# Patient Record
Sex: MALE | Race: WHITE | Employment: OTHER | URBAN - METROPOLITAN AREA
[De-identification: names, ages, dates, MRNs, and addresses within clinical notes are randomized per-mention and may not be internally consistent; named-entity substitution may affect disease eponyms.]

---

## 2019-01-10 ENCOUNTER — APPOINTMENT (OUTPATIENT)
Dept: GENERAL RADIOLOGY | Age: 69
End: 2019-01-10
Attending: EMERGENCY MEDICINE
Payer: MEDICARE

## 2019-01-10 ENCOUNTER — APPOINTMENT (OUTPATIENT)
Dept: CT IMAGING | Age: 69
End: 2019-01-10
Attending: EMERGENCY MEDICINE
Payer: MEDICARE

## 2019-01-10 ENCOUNTER — HOSPITAL ENCOUNTER (EMERGENCY)
Age: 69
Discharge: PSYCHIATRIC OR SUBSTANCE USE FACILITY | End: 2019-01-10
Attending: EMERGENCY MEDICINE
Payer: MEDICARE

## 2019-01-10 VITALS
HEART RATE: 93 BPM | OXYGEN SATURATION: 99 % | BODY MASS INDEX: 21.77 KG/M2 | RESPIRATION RATE: 19 BRPM | HEIGHT: 69 IN | DIASTOLIC BLOOD PRESSURE: 89 MMHG | TEMPERATURE: 98.3 F | SYSTOLIC BLOOD PRESSURE: 160 MMHG | WEIGHT: 147 LBS

## 2019-01-10 DIAGNOSIS — S00.432A HEMATOMA OF LEFT EAR, INITIAL ENCOUNTER: Primary | ICD-10-CM

## 2019-01-10 DIAGNOSIS — D69.6 THROMBOCYTOPENIA (HCC): ICD-10-CM

## 2019-01-10 DIAGNOSIS — H93.8X2 MASS OF LEFT EAR: ICD-10-CM

## 2019-01-10 DIAGNOSIS — R74.8 ABNORMAL LIVER ENZYMES: ICD-10-CM

## 2019-01-10 LAB
ALBUMIN SERPL-MCNC: 4.3 G/DL (ref 3.4–5)
ALBUMIN/GLOB SERPL: 1.3 {RATIO} (ref 0.8–1.7)
ALP SERPL-CCNC: 141 U/L (ref 45–117)
ALT SERPL-CCNC: 164 U/L (ref 16–61)
ANION GAP SERPL CALC-SCNC: 8 MMOL/L (ref 3–18)
AST SERPL-CCNC: 262 U/L (ref 15–37)
BASOPHILS # BLD: 0 K/UL (ref 0–0.1)
BASOPHILS NFR BLD: 0 % (ref 0–2)
BILIRUB SERPL-MCNC: 2.2 MG/DL (ref 0.2–1)
BUN SERPL-MCNC: 8 MG/DL (ref 7–18)
BUN/CREAT SERPL: 12 (ref 12–20)
CALCIUM SERPL-MCNC: 10 MG/DL (ref 8.5–10.1)
CHLORIDE SERPL-SCNC: 100 MMOL/L (ref 100–108)
CO2 SERPL-SCNC: 28 MMOL/L (ref 21–32)
CREAT SERPL-MCNC: 0.67 MG/DL (ref 0.6–1.3)
DIFFERENTIAL METHOD BLD: ABNORMAL
EOSINOPHIL # BLD: 0.1 K/UL (ref 0–0.4)
EOSINOPHIL NFR BLD: 1 % (ref 0–5)
ERYTHROCYTE [DISTWIDTH] IN BLOOD BY AUTOMATED COUNT: 13.4 % (ref 11.6–14.5)
ETHANOL SERPL-MCNC: <3 MG/DL (ref 0–3)
GLOBULIN SER CALC-MCNC: 3.3 G/DL (ref 2–4)
GLUCOSE SERPL-MCNC: 148 MG/DL (ref 74–99)
HCT VFR BLD AUTO: 38.8 % (ref 36–48)
HGB BLD-MCNC: 13.3 G/DL (ref 13–16)
LYMPHOCYTES # BLD: 1.2 K/UL (ref 0.9–3.6)
LYMPHOCYTES NFR BLD: 28 % (ref 21–52)
MCH RBC QN AUTO: 33.8 PG (ref 24–34)
MCHC RBC AUTO-ENTMCNC: 34.3 G/DL (ref 31–37)
MCV RBC AUTO: 98.7 FL (ref 74–97)
MONOCYTES # BLD: 0.5 K/UL (ref 0.05–1.2)
MONOCYTES NFR BLD: 13 % (ref 3–10)
NEUTS SEG # BLD: 2.3 K/UL (ref 1.8–8)
NEUTS SEG NFR BLD: 58 % (ref 40–73)
PLATELET # BLD AUTO: 71 K/UL (ref 135–420)
PMV BLD AUTO: 10.3 FL (ref 9.2–11.8)
POTASSIUM SERPL-SCNC: 3.4 MMOL/L (ref 3.5–5.5)
PROT SERPL-MCNC: 7.6 G/DL (ref 6.4–8.2)
RBC # BLD AUTO: 3.93 M/UL (ref 4.7–5.5)
SODIUM SERPL-SCNC: 136 MMOL/L (ref 136–145)
WBC # BLD AUTO: 4.1 K/UL (ref 4.6–13.2)

## 2019-01-10 PROCEDURE — 93005 ELECTROCARDIOGRAM TRACING: CPT

## 2019-01-10 PROCEDURE — 85025 COMPLETE CBC W/AUTO DIFF WBC: CPT

## 2019-01-10 PROCEDURE — 96374 THER/PROPH/DIAG INJ IV PUSH: CPT

## 2019-01-10 PROCEDURE — 74011250636 HC RX REV CODE- 250/636: Performed by: EMERGENCY MEDICINE

## 2019-01-10 PROCEDURE — 70450 CT HEAD/BRAIN W/O DYE: CPT

## 2019-01-10 PROCEDURE — 80307 DRUG TEST PRSMV CHEM ANLYZR: CPT

## 2019-01-10 PROCEDURE — 71045 X-RAY EXAM CHEST 1 VIEW: CPT

## 2019-01-10 PROCEDURE — 96361 HYDRATE IV INFUSION ADD-ON: CPT

## 2019-01-10 PROCEDURE — 80053 COMPREHEN METABOLIC PANEL: CPT

## 2019-01-10 PROCEDURE — 99285 EMERGENCY DEPT VISIT HI MDM: CPT

## 2019-01-10 RX ORDER — FAMOTIDINE 10 MG/ML
20 INJECTION INTRAVENOUS
Status: COMPLETED | OUTPATIENT
Start: 2019-01-10 | End: 2019-01-10

## 2019-01-10 RX ADMIN — FAMOTIDINE 20 MG: 10 INJECTION, SOLUTION INTRAVENOUS at 15:36

## 2019-01-10 RX ADMIN — SODIUM CHLORIDE 1000 ML: 900 INJECTION, SOLUTION INTRAVENOUS at 12:02

## 2019-01-10 NOTE — ED NOTES
Purposeful rounding completed: 
 
Side rails up x 2:  YES Bed in low position and wheels locked: YES Call bell within reach: YES Comfort addressed: YES Toileting needs addressed: YES Plan of care reviewed/updated with patient and or family members: YES 
IV site assessed: YES Pain assessed and addressed: YES, 6 Pt given urinal; informed pt that transportation would be here to pick him up within the hour

## 2019-01-10 NOTE — ED NOTES
Debrided pt ear and scalp with arm soapy water; marble size lump on the ear was the only area that was bleeding; Did not see any lacerations or abrasions.  Pt was very sensitive to us touching the area but was not complaining of pain;

## 2019-01-10 NOTE — ED NOTES
Pt stated that he has elevated blood pressure and elevated heart rate for at least 10 years. Pt also stated that he does not take any medication for his bp or hr. Pt also stated that he has skin cancer on his left ear despite not having a biopsy or being seen for his ear.

## 2019-01-10 NOTE — ED PROVIDER NOTES
EMERGENCY DEPARTMENT HISTORY AND PHYSICAL EXAM 
 
10:51 AM 
 
 
Date: 1/10/2019 Patient Name: En Ramírez History of Presenting Illness Chief Complaint Patient presents with  Fall  
  left ear has laceration and dried blood History Provided By: Patient Chief Complaint: left ear wound Duration:  last night Timing:  Acute Location: left ear Severity: Moderate Modifying Factors: Pt reports that he was watching TV and woke up and noticed that his ear was bleeding. He woke up on the floor. Associated Symptoms: Denies CP, SOB, Fever, NVD, and cough. Additional History (Context): En Ramírez is a 70 y.o. male with back problems-\"broke his back\" who presents with acute moderate left ear wound that occurred last night. Pt reports that he was watching TV and woke up and noticed that his ear was bleeding. He woke up on the floor. Pt uses a cane for ambulation. Denies CP, SOB, Fever, NVD, and cough. Pt is on Asprin. Pt reports long history of falls due to dizziness- has had workups including MRI in past with no etiology found. Also reports mass on ear for 10 years which he has not seen a doctor for. He lives alone. No ETOH in 3 days but reports he is alcoholic. No drug use. He smokes cigars. PCP: UNKNOWN 
 
 
 
Past History Past Medical History: 
Back problems-\"broke his back\" Past Surgical History: No past surgical history on file. Family History: No family history on file. Social History: 
Social History Tobacco Use  Smoking status: Not on file Substance Use Topics  Alcohol use: Not on file  Drug use: Not on file Allergies: 
No Known Allergies Review of Systems Review of Systems Constitutional: Negative for chills. HENT: Negative for congestion and sore throat. Respiratory: Negative for cough and shortness of breath. Cardiovascular: Negative for chest pain. Gastrointestinal: Negative for abdominal pain, diarrhea, nausea and vomiting. Genitourinary: Negative for dysuria. Musculoskeletal: Negative for back pain. Skin: Positive for wound (ear). Negative for rash. Neurological: Negative for dizziness and headaches. All other systems reviewed and are negative. Physical Exam  
 
Visit Vitals /81 Pulse 93 Temp 98.3 °F (36.8 °C) Resp 19 Ht 5' 9\" (1.753 m) Wt 66.7 kg (147 lb) SpO2 100% BMI 21.71 kg/m² Physical Exam 
General Exam: Patient is well developed and well nourished in no distress. Patient does not appear acutely ill or toxic. Eye Exam: Lids and conjunctiva are normal 
ENT Exam: The neck is supple without meningeal signs. No significant adenopathy. L ear: abnormal marble size mass to superior portion of left ear with active bleeding/oozing. Large auricular hematoma. Pulmonary Exam: No respiratory distress. The respiratory rate is normal.  No stridor. The breath sounds are equal bilaterally. There are no wheezes, rales, or rhonchi noted. Cardiac Exam: Tachycardic. Cardiac rhythm ise normal.  No significant murmurs, rubs, or gallops. The peripheral pulses are normal. 
Abdominal Exam: Abdomen is soft and non-distended. No pulsatile masses. There is no local tenderness. There is no rebound or guarding noted. Skin and Soft Tissue: The skin is warm and dry, without significant abnormality. Good color. Musculoskeletal Exam: There is no clubbing or cyanosis. There is no peripheral edema. The musculoskeletal exam of the lower extremities is normal without significant local tenderness or spasm. Neurologic: Pt is alert and appropriate. Normal speech. Normal symmetric muscle strength and tone in all extremities. Normal gait. Psychiatric: Normal adult with appropriate demeanor and interpersonal interaction. Is oriented to person, place, and time. Diagnostic Study Results Labs - 
 Recent Results (from the past 12 hour(s)) METABOLIC PANEL, COMPREHENSIVE Collection Time: 01/10/19 11:35 AM  
Result Value Ref Range Sodium 136 136 - 145 mmol/L Potassium 3.4 (L) 3.5 - 5.5 mmol/L Chloride 100 100 - 108 mmol/L  
 CO2 28 21 - 32 mmol/L Anion gap 8 3.0 - 18 mmol/L Glucose 148 (H) 74 - 99 mg/dL BUN 8 7.0 - 18 MG/DL Creatinine 0.67 0.6 - 1.3 MG/DL  
 BUN/Creatinine ratio 12 12 - 20 GFR est AA >60 >60 ml/min/1.73m2 GFR est non-AA >60 >60 ml/min/1.73m2 Calcium 10.0 8.5 - 10.1 MG/DL Bilirubin, total 2.2 (H) 0.2 - 1.0 MG/DL  
 ALT (SGPT) 164 (H) 16 - 61 U/L  
 AST (SGOT) 262 (H) 15 - 37 U/L Alk. phosphatase 141 (H) 45 - 117 U/L Protein, total 7.6 6.4 - 8.2 g/dL Albumin 4.3 3.4 - 5.0 g/dL Globulin 3.3 2.0 - 4.0 g/dL A-G Ratio 1.3 0.8 - 1.7    
CBC WITH AUTOMATED DIFF Collection Time: 01/10/19 11:35 AM  
Result Value Ref Range WBC 4.1 (L) 4.6 - 13.2 K/uL  
 RBC 3.93 (L) 4.70 - 5.50 M/uL  
 HGB 13.3 13.0 - 16.0 g/dL HCT 38.8 36.0 - 48.0 % MCV 98.7 (H) 74.0 - 97.0 FL  
 MCH 33.8 24.0 - 34.0 PG  
 MCHC 34.3 31.0 - 37.0 g/dL  
 RDW 13.4 11.6 - 14.5 % PLATELET 71 (L) 556 - 420 K/uL MPV 10.3 9.2 - 11.8 FL  
 NEUTROPHILS 58 40 - 73 % LYMPHOCYTES 28 21 - 52 % MONOCYTES 13 (H) 3 - 10 % EOSINOPHILS 1 0 - 5 % BASOPHILS 0 0 - 2 %  
 ABS. NEUTROPHILS 2.3 1.8 - 8.0 K/UL  
 ABS. LYMPHOCYTES 1.2 0.9 - 3.6 K/UL  
 ABS. MONOCYTES 0.5 0.05 - 1.2 K/UL  
 ABS. EOSINOPHILS 0.1 0.0 - 0.4 K/UL  
 ABS. BASOPHILS 0.0 0.0 - 0.1 K/UL  
 DF AUTOMATED    
ETHYL ALCOHOL Collection Time: 01/10/19 11:35 AM  
Result Value Ref Range ALCOHOL(ETHYL),SERUM <3 0 - 3 MG/DL  
EKG, 12 LEAD, INITIAL Collection Time: 01/10/19 11:35 AM  
Result Value Ref Range Ventricular Rate 103 BPM  
 Atrial Rate 103 BPM  
 P-R Interval 142 ms QRS Duration 76 ms  
 Q-T Interval 354 ms QTC Calculation (Bezet) 463 ms Calculated P Axis 34 degrees Calculated R Axis 8 degrees Calculated T Axis 22 degrees Diagnosis Sinus tachycardia Possible Left atrial enlargement Nonspecific ST abnormality Abnormal ECG No previous ECGs available Radiologic Studies -  
CT HEAD WO CONT Final Result IMPRESSION:  
  
  
1. No acute intracranial abnormality. 2. Mild periventricular white matter hypoattenuation; a nonspecific finding,  
likely reflecting chronic ischemic microvascular change. XR CHEST PORT    (Results Pending) Medical Decision Making I am the first provider for this patient. I reviewed the vital signs, available nursing notes, past medical history, past surgical history, family history and social history. Vital Signs-Reviewed the patient's vital signs. Pulse Oximetry Analysis -  100%L of O2 via NC  
 
EKG: Interpreted by the EP. Time Interpreted: 9381 Rate: 103 Rhythm: Sinus Tachycardia Interpretation:No STEMI Records Reviewed: Nursing Notes (Time of Review: 10:51 AM) ED Course: Progress Notes, Reevaluation, and Consults: 
2:06 PM Consult: I discussed care with Dr. Jackelyn myles (ENT at [facility]). It was a standard discussion including patient history, chief complaint, available diagnostic results, and predicted treatment course. Will see pt. Requests transfer to ED. 
 
2:08 PM Consult: I discussed care with Dr. Sacha Lugo  (ED Doctor at [facility]). It was a standard discussion including patient history, chief complaint, available diagnostic results, and predicted treatment course. Accepts pt for transfer. Provider Notes (Medical Decision Making): Pt with bleeding wound from L ear after fall at home. Fall appears to be due to history of dizziness causing falls. EKG and labs reviewed. Doubt cardiac ischemia or arrythmia at this time. Platelets low and pt has abnormal LFTs. CT head neg.  Wound has been cleaned and pt has large auricular hematoma which requires drainage. Pt transferred to [facility] for ENT evaluation. Diagnosis Clinical Impression: 1. Hematoma of left ear, initial encounter 2. Thrombocytopenia ([address] 3. Abnormal liver enzymes 4. Mass of left ear Disposition: Transfer Follow-up Information None Medication List  
  
You have not been prescribed any medications. _______________________________ Scribe Attestation Valerie Sun acting as a scribe for and in the presence of Panfilo Armando MD     
January 10, 2019 at 10:51 AM 
    
Provider Attestation:     
I personally performed the services described in the documentation, reviewed the documentation, as recorded by the scribe in my presence, and it accurately and completely records my words and actions. January 10, 2019 at 10:51 AM - Panfilo Armando MD   
 
 
_______________________________

## 2019-01-10 NOTE — ED NOTES
Purposeful rounding completed: 
 
Side rails up x 2:  YES Bed in low position and wheels locked: YES Call bell within reach: YES Comfort addressed: YES Toileting needs addressed: YES Plan of care reviewed/updated with patient and or family members: YES 
IV site assessed: YES Pain assessed and addressed: YES, 5 More debridement of wound on ear done; pt complaining of some ear pain/sensitivity

## 2019-01-10 NOTE — ED NOTES
Removed wet gauze from ear and debrided some of the ear. There is a wound on the top of the ear which is about the size of a marble that the pt stated has been there for 5 years; it is bleeding slightly; I scrubbed some of the head and got off most of the blood on the scalp and did not see any lacerations

## 2019-01-10 NOTE — ED NOTES
Purposeful rounding completed: 
 
Side rails up x 2:  YES Bed in low position and wheels locked: YES Call bell within reach: YES Comfort addressed: YES Toileting needs addressed: YES Plan of care reviewed/updated with patient and or family members: YES 
IV site assessed: YES Pain assessed and addressed: YES, 5 Pt provided urinal; some debridement of ear done after soaking the wound;

## 2019-01-11 LAB
ATRIAL RATE: 103 BPM
CALCULATED P AXIS, ECG09: 34 DEGREES
CALCULATED R AXIS, ECG10: 8 DEGREES
CALCULATED T AXIS, ECG11: 22 DEGREES
DIAGNOSIS, 93000: NORMAL
P-R INTERVAL, ECG05: 142 MS
Q-T INTERVAL, ECG07: 354 MS
QRS DURATION, ECG06: 76 MS
QTC CALCULATION (BEZET), ECG08: 463 MS
VENTRICULAR RATE, ECG03: 103 BPM